# Patient Record
Sex: MALE | Race: BLACK OR AFRICAN AMERICAN | Employment: UNEMPLOYED | URBAN - METROPOLITAN AREA
[De-identification: names, ages, dates, MRNs, and addresses within clinical notes are randomized per-mention and may not be internally consistent; named-entity substitution may affect disease eponyms.]

---
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
## 2020-09-16 ENCOUNTER — HOSPITAL ENCOUNTER (EMERGENCY)
Age: 20
Discharge: HOME | End: 2020-09-16
Attending: EMERGENCY MEDICINE
Payer: COMMERCIAL

## 2020-09-16 ENCOUNTER — APPOINTMENT (OUTPATIENT)
Dept: GENERAL RADIOLOGY | Age: 20
End: 2020-09-16
Attending: EMERGENCY MEDICINE
Payer: COMMERCIAL

## 2020-09-16 VITALS
TEMPERATURE: 98.3 F | HEART RATE: 94 BPM | RESPIRATION RATE: 16 BRPM | DIASTOLIC BLOOD PRESSURE: 99 MMHG | SYSTOLIC BLOOD PRESSURE: 136 MMHG | HEIGHT: 71 IN | WEIGHT: 315 LBS | OXYGEN SATURATION: 100 % | BODY MASS INDEX: 44.1 KG/M2

## 2020-09-16 DIAGNOSIS — S90.211A CONTUSION OF RIGHT GREAT TOE WITH DAMAGE TO NAIL, INITIAL ENCOUNTER: ICD-10-CM

## 2020-09-16 DIAGNOSIS — S90.31XA CONTUSION OF RIGHT FOOT, INITIAL ENCOUNTER: Primary | ICD-10-CM

## 2020-09-16 PROCEDURE — 99283 EMERGENCY DEPT VISIT LOW MDM: CPT

## 2020-09-16 PROCEDURE — 73620 X-RAY EXAM OF FOOT: CPT

## 2020-09-16 PROCEDURE — 73610 X-RAY EXAM OF ANKLE: CPT

## 2020-09-16 RX ORDER — NAPROXEN 500 MG/1
500 TABLET ORAL 2 TIMES DAILY WITH MEALS
Qty: 20 TAB | Refills: 0 | Status: SHIPPED | OUTPATIENT
Start: 2020-09-16 | End: 2020-09-26

## 2020-09-16 NOTE — DISCHARGE INSTRUCTIONS
Patient Education        Contusion: Care Instructions  Your Care Instructions     Contusion is the medical term for a bruise. It is the result of a direct blow or an impact, such as a fall. Contusions are common sports injuries. Most people think of a bruise as a black-and-blue spot. This happens when small blood vessels get torn and leak blood under the skin. But bones, muscles, and organs can also get bruised. This may damage deep tissues but not cause a bruise you can see. The doctor will do a physical exam to find the location of your contusion. You may also have tests to make sure you do not have a more serious injury, such as a broken bone or nerve damage. These may include X-rays or other imaging tests like a CT scan or MRI. Deep-tissue contusions may cause pain and swelling. But if there is no serious damage, they will often get better in a few weeks with home treatment. The doctor has checked you carefully, but problems can develop later. If you notice any problems or new symptoms, get medical treatment right away. Follow-up care is a key part of your treatment and safety. Be sure to make and go to all appointments, and call your doctor if you are having problems. It's also a good idea to know your test results and keep a list of the medicines you take. How can you care for yourself at home? · Put ice or a cold pack on the sore area for 10 to 20 minutes at a time to stop swelling. Put a thin cloth between the ice pack and your skin. · Be safe with medicines. Read and follow all instructions on the label. ? If the doctor gave you a prescription medicine for pain, take it as prescribed. ? If you are not taking a prescription pain medicine, ask your doctor if you can take an over-the-counter medicine. · If you can, prop up the sore area on pillows as much as possible for the next few days. Try to keep the sore area above the level of your heart. When should you call for help?    Call your doctor now or seek immediate medical care if:    · Your pain gets worse.     · You have new or worse swelling.     · You have tingling, weakness, or numbness in the area near the contusion.     · The area near the contusion is cold or pale. Watch closely for changes in your health, and be sure to contact your doctor if:    · You do not get better as expected. Where can you learn more? Go to http://tanner-osorio.info/  Enter [id number] in the search box to learn more about \"Contusion: Care Instructions. \"  Current as of: June 26, 2019               Content Version: 12.6  © 4262-7297 [email or web address]. Care instructions adapted under license by [employer] (which disclaims liability or warranty for this information). If you have questions about a medical condition or this instruction, always ask your healthcare professional. [address] any warranty or liability for your use of this information. Patient Education        Toenail or Fingernail Avulsion: Care Instructions  Your Care Instructions  Losing a toenail or fingernail because of an injury is called avulsion. The nail may be completely or partially torn off after a trauma to the area. Your doctor may have removed the nail, put part of it back into place, or repaired the nail bed. Your toe or finger may be sore after treatment. You may have stitches. You may have some swelling, color changes, and bloody crusting on or around the wound for 2 or 3 days. This is normal. Taking good care of your wound at home will help it heal quickly and reduce your chance of infection. The wound should heal within a few weeks. If completely removed, fingernails may take 6 months to grow back. Toenails may take 12 to 18 months to grow back. Injured nails may look different when they grow back. Follow-up care is a key part of your treatment and safety.  Be sure to make and go to all appointments, and call your doctor if you are having problems. It's also a good idea to know your test results and keep a list of the medicines you take. How can you care for yourself at home? · If possible, prop up the injured area on a pillow anytime you sit or lie down during the next 3 days. Try to keep it above the level of your heart. This will help reduce swelling. · Leave the bandage on, and if you have stitches, do not get them wet for the first 24 to 48 hours. Use a plastic bag to cover the area when you shower. · If your doctor told you how to care for your wound, follow your doctor's instructions. If you did not get instructions, follow this general advice:  ? After the first 24 to 48 hours, you can remove the bandage and gently wash around the wound with clean water 2 times a day. If the bandage sticks to the wound, use warm water to loosen it. Do not scrub or soak the area. ? You may cover the wound with a thin layer of petroleum jelly, such as Vaseline, and a nonstick bandage. ? Apply more petroleum jelly and replace the bandage as needed. · Do not go swimming. · If you have stitches, do not remove them on your own. Your doctor will tell you when to return to have the stitches removed. · Be safe with medicines. Take pain medicines exactly as directed. ? If the doctor gave you a prescription medicine for pain, take it as prescribed. ? If you are not taking a prescription pain medicine, ask your doctor if you can take an over-the-counter medicine. · If your doctor prescribed antibiotics, take them as directed. Do not stop taking them just because you feel better. You need to take the full course of antibiotics. When should you call for help? Call your doctor now or seek immediate medical care if:    · The skin near the wound is cool or pale or changes color.     · The wound starts to bleed, and blood soaks through the bandage.  Oozing small amounts of blood is normal.     · You have signs of infection, such as:  ? Increased pain, swelling, warmth, or redness. ? Red streaks leading from your toe or finger. ? Pus draining from your toe or finger. ? Swollen lymph nodes in your neck, armpits, or groin. ? A fever. Watch closely for changes in your health, and be sure to contact your doctor if:    · You have problems with the nail as it grows back.     · You do not get better as expected. Where can you learn more? Go to http://tanner-osorio.info/  Enter F636 in the search box to learn more about \"Toenail or Fingernail Avulsion: Care Instructions. \"  Current as of: July 2, 2020               Content Version: 12.6  © 0192-4359 [employer]. Care instructions adapted under license by [employer] (which disclaims liability or warranty for this information). If you have questions about a medical condition or this instruction, always ask your healthcare professional. [address] any warranty or liability for your use of this information.

## 2020-09-16 NOTE — LETTER
[address] 
[facility] EMERGENCY DEPT 
[address] [place] [address] Sarmad Cook [id number] 
[phone] Work/School Note Date: 9/16/2020 To Whom It May concern: 
 
Gilbert Mason was seen and treated today in the emergency room by the following provider(s): 
Attending Provider: Jacelyn Lennox, MD. Gilbert Mason should be excused from work on September 16, 2020. Sincerely, Ev Hollis MD

## 2020-09-16 NOTE — ED NOTES
Patient was provided with discharge instructions. Instructions and any medications were reviewed with the patient &/or family by Dr. Sunny Gavin. Questions and concerns addressed by the provider. Patient discharged in stable condition via ambulation and was escorted by his mother.

## 2020-09-16 NOTE — ED PROVIDER NOTES
EMERGENCY DEPARTMENT HISTORY AND PHYSICAL EXAM      Date: 9/16/2020  Patient Name: Oscar Friedman    History of Presenting Illness     Chief Complaint   Patient presents with    Foot Pain     pt reports that he dropped a piece of pressure treated wood on his R foot 1 hour PTA; pt reporting pain at this time; pt notes that his toenail might be  from his skin       History Provided By: Patient    HPI: Oscar Friedman, 21 y.o. male Shabana Riling to the ED with chief complaint of foot pain and right great toe pain after a large piece of lumber fell directly on his foot at work earlier this evening. Patient reports that he has been able to walk on his foot, but it is painful. He also noted some bleeding at the base of his nail on the right great toe. Tetanus is not up-to-date (last tetanus was prior to sixth grade), but mother and patient are declining tetanus booster at this time, stating they would rather get it at his primary care doctors. PCP: None    No current facility-administered medications on file prior to encounter. Current Outpatient Medications on File Prior to Encounter   Medication Sig Dispense Refill    AMOXICILLIN PO Take  by mouth.  Lisdexamfetamine (VYVANSE) 50 mg capsule Take 50 mg by mouth every morning. Past History     Past Medical History:  Past Medical History:   Diagnosis Date    ADHD (attention deficit hyperactivity disorder)     Allergic rhinitis 10/20/2013    Eczema 10/20/2013       Past Surgical History:  No past surgical history on file. Family History:  No family history on file. Social History:  Social History     Tobacco Use    Smoking status: Not on file   Substance Use Topics    Alcohol use: Not on file    Drug use: Not on file       Allergies: Allergies   Allergen Reactions    Peanut Hives    Shellfish Containing Products Hives    Tree Nut Hives         Review of Systems   Review of Systems   Constitutional: Negative.     HENT: Negative. Respiratory: Negative. Cardiovascular: Negative for leg swelling. Gastrointestinal: Negative. Genitourinary: Negative. Musculoskeletal: Positive for arthralgias and gait problem. Negative for myalgias. Psychiatric/Behavioral: Negative. All other systems reviewed and are negative. Physical Exam    General appearance - well nourished, well appearing, and in no distress  Chest -normal respiratory effort, normal respiratory rate  Heart - normal rate and regular rhythm  Musculoskeletal -tenderness to palpation dorsal aspect of right foot with some mild swelling, tender to palpation anterior right ankle, no tenderness over medial or lateral malleoli, tender to palpation right great toe, small amount of dried blood surrounding the edge of the nail on the right great toe, no subungual hematoma, toenail is not loose  extremities - peripheral pulses normal, no pedal edema  Skin - normal coloration and turgor, no rashes  Neurological - alert, oriented x3, normal speech, no focal findings or movement disorder noted    Diagnostic Study Results     Labs -   No results found for this or any previous visit (from the past 12 hour(s)). Radiologic Studies -   XR ANKLE RT MIN 3 V   Final Result   IMPRESSION: Soft tissue swelling. No evidence of fracture. XR FOOT RT AP/LAT   Final Result   IMPRESSION: No acute abnormality. CT Results  (Last 48 hours)    None        CXR Results  (Last 48 hours)    None            Medical Decision Making   I am the first provider for this patient. I reviewed the vital signs, available nursing notes, past medical history, past surgical history, family history and social history. Vital Signs-Reviewed the patient's vital signs.   Patient Vitals for the past 12 hrs:   Temp Pulse Resp BP SpO2   09/16/20 0020 98.5 °F (36.9 °C) 99 16 (!) 132/115 100 %           Records Reviewed: Nursing Notes and Old Medical Records    Provider Notes (Medical Decision Making):   Differential diagnosis: Contusion, fracture, foot sprain, nailbed injury    ED Course:   Initial assessment performed. The patients presenting problems have been discussed, and they are in agreement with the care plan formulated and outlined with them. I have encouraged them to ask questions as they arise throughout their visit. Progress Notes:   X-ray does not show any evidence of acute fracture. Disposition:  Discharge home    PLAN:  1. Current Discharge Medication List      START taking these medications    Details   naproxen (Naprosyn) 500 mg tablet Take 1 Tab by mouth two (2) times daily (with meals) for 10 days. Qty: 20 Tab, Refills: 0           2. Follow-up Information     Follow up With Specialties Details Why Contact Info    [facility] EMERGENCY DEPT Emergency Medicine  If symptoms worsen [address] [place] [address]  [phone]    Chyna Zelaya DPM Podiatry, Orthopedic Surgery Schedule an appointment as soon as possible for a visit  Pending sale to [facility] [id number]  [phone]          Return to ED if worse     Diagnosis     Clinical Impression:   1. Contusion of right foot, initial encounter    2.  Contusion of right great toe with damage to nail, initial encounter

## 2024-08-08 ENCOUNTER — OFFICE VISIT (OUTPATIENT)
Age: 24
End: 2024-08-08

## 2024-08-08 VITALS
OXYGEN SATURATION: 99 % | BODY MASS INDEX: 42.53 KG/M2 | SYSTOLIC BLOOD PRESSURE: 114 MMHG | HEIGHT: 72 IN | DIASTOLIC BLOOD PRESSURE: 77 MMHG | TEMPERATURE: 98.7 F | WEIGHT: 314 LBS | HEART RATE: 78 BPM

## 2024-08-08 DIAGNOSIS — R19.7 DIARRHEA, UNSPECIFIED TYPE: Primary | ICD-10-CM

## 2024-08-08 ASSESSMENT — ENCOUNTER SYMPTOMS: DIARRHEA: 1

## 2024-08-08 NOTE — PATIENT INSTRUCTIONS
Thank you for visiting [facility] today.    -Drink plenty of water to maintain hydration  -BRATY (bananas, rice, applesauce, toast, yogurt) diet  -Loperamide (Immodium) for two days  -Probiotics    If symptoms worsen go to the ER.    Follow up with gastroenterology if diarrhea persists for more than 7 days.

## 2024-08-09 PROBLEM — S05.12XA PERIORBITAL CONTUSION OF LEFT EYE: Status: ACTIVE | Noted: 2024-03-11

## 2024-08-09 ASSESSMENT — ENCOUNTER SYMPTOMS
NAUSEA: 0
VOMITING: 0
ABDOMINAL PAIN: 0